# Patient Record
Sex: MALE | Race: WHITE | Employment: OTHER | URBAN - NONMETROPOLITAN AREA
[De-identification: names, ages, dates, MRNs, and addresses within clinical notes are randomized per-mention and may not be internally consistent; named-entity substitution may affect disease eponyms.]

---

## 2019-01-01 ENCOUNTER — HOSPITAL ENCOUNTER (EMERGENCY)
Age: 84
End: 2019-07-26
Attending: EMERGENCY MEDICINE
Payer: MEDICARE

## 2019-01-01 VITALS — WEIGHT: 220 LBS | HEIGHT: 70 IN | BODY MASS INDEX: 31.5 KG/M2

## 2019-01-01 DIAGNOSIS — I46.9 CARDIAC ARREST (HCC): Primary | ICD-10-CM

## 2019-01-01 PROCEDURE — 99285 EMERGENCY DEPT VISIT HI MDM: CPT

## 2019-01-01 PROCEDURE — 92950 HEART/LUNG RESUSCITATION CPR: CPT

## 2019-01-01 PROCEDURE — 6360000002 HC RX W HCPCS: Performed by: EMERGENCY MEDICINE

## 2019-01-01 RX ADMIN — EPINEPHRINE 1 MG: 0.1 INJECTION, SOLUTION ENDOTRACHEAL; INTRACARDIAC; INTRAVENOUS at 19:26

## 2019-07-26 NOTE — ED PROVIDER NOTES
Emergency Department Attending Note    Rozina Zamarripa MD    Date of ED VIsit: 7/26/2019    CHIEF COMPLAINT  No chief complaint on file. HISTORY OF PRESENT ILLNESS  Genny Robledo is a 80 y.o. male  With Vital signs of There were no vitals taken for this visit. who presents to the ED with a complaint of cardiac arrest. Patient seen and evaluated in room 1. Patient was received from [facility] department in cardiac arrest with a Lemuel device in place and ET tube properly placed. They have been running the cardiac arrest for about 30 minutes with asystole on the monitor. Never did the patient have any V. fib or V. tach. He arrived here in the emergency department with CPR in progress. A line was in place as well he was given 1 amp of epinephrine with no change in his cardiac monitor so the a cardiac arrest time was 35 minutes at which point I feel we entered the realm of futility and ability to resuscitate this patient so the arrest was called and he was pronounced at 1928 hrs. ET tube was playing in good place by verified by auscultation. Everlene Melgoza device was stopped in asystole was found on the monitor. The history we can get from the EMS crew is that he had a recent valve surgery which is unclear at this time. Patients Past medical history reviewed and listed below  Past Medical History:   Diagnosis Date    CHF (congestive heart failure) ([address]     COPD (chronic obstructive pulmonary disease) ([address]     Hypertension      Past Surgical History:   Procedure Laterality Date    APPENDECTOMY      CARDIAC VALVE REPLACEMENT      HERNIA REPAIR      PROSTATE SURGERY         I have reviewed the following from the nursing documentation. No family history on file.   Social History     Socioeconomic History    Marital status:      Spouse name: Not on file    Number of children: Not on file    Years of education: Not on file    Highest education level: Not on file   Occupational History    Not on file   Social Needs    Financial resource strain: Not on file    Food insecurity:     Worry: Not on file     Inability: Not on file    Transportation needs:     Medical: Not on file     Non-medical: Not on file   Tobacco Use    Smoking status: Former Smoker   Substance and Sexual Activity    Alcohol use: No    Drug use:  Yes    Sexual activity: Not on file   Lifestyle    Physical activity:     Days per week: Not on file     Minutes per session: Not on file    Stress: Not on file   Relationships    Social connections:     Talks on phone: Not on file     Gets together: Not on file     Attends Hoah[employer]ism service: Not on file     Active member of club or organization: Not on file     Attends meetings of clubs or organizations: Not on file     Relationship status: Not on file    Intimate partner violence:     Fear of current or ex partner: Not on file     Emotionally abused: Not on file     Physically abused: Not on file     Forced sexual activity: Not on file   Other Topics Concern    Not on file   Social History Narrative    Not on file     Current Facility-Administered Medications   Medication Dose Route Frequency Provider Last Rate Last Dose    EPINEPHrine PF 1 MG/10ML injection 1 mg  1 mg Intravenous Once Chapin Cadena MD        EPINEPHrine PF 1 MG/10ML injection    Daily PRN Chapin Cadena MD   1 mg at 07/26/19 1926     Current Outpatient Medications   Medication Sig Dispense Refill    metoprolol succinate (TOPROL XL) 25 MG extended release tablet Take 25 mg by mouth daily      aspirin 81 MG tablet Take 81 mg by mouth daily      Multiple Vitamins-Minerals (THERAPEUTIC MULTIVITAMIN-MINERALS) tablet Take 1 tablet by mouth daily      lisinopril (PRINIVIL;ZESTRIL) 10 MG tablet Take 10 mg by mouth daily Indications: pt not completely sure that lisinopril is the correct medication; pt reports he gets his medications filled at [facility]       No Known Allergies    REVIEW OF SYSTEMS  We will

## 2019-07-26 NOTE — ED NOTES
Dr. Chris Samuels spoke with family prior nurse just spoke with them and have explained the situation further, pt some of them wanted to view the pt who has been cleaned up and are currently at his side.       Aquiles Paige RN  07/26/19 1958

## 2019-07-27 NOTE — ED NOTES
[facility] informed of death referral number [id number] initials BRYSON Albarran, LEXIS  07/26/19 2058

## 2019-07-27 NOTE — ED NOTES
Family informs nurse they are not sure who will handle the  home arrangements tonight, they request the body be sent to the morgue to hold until this decision is reached. They are going home at this time. Nurse explained we are waiting on the  released and if that were to happen then we would send him to [facility].       Alvin Frederick RN  [phone]

## 2019-07-27 NOTE — ED NOTES
[facility] informed of family left and pt being sent to [facility] [place].       Jorje Rollins, RN  07/26/19 [id number]